# Patient Record
Sex: MALE | Race: OTHER | Employment: FULL TIME | URBAN - METROPOLITAN AREA
[De-identification: names, ages, dates, MRNs, and addresses within clinical notes are randomized per-mention and may not be internally consistent; named-entity substitution may affect disease eponyms.]

---

## 2018-05-12 ENCOUNTER — APPOINTMENT (OUTPATIENT)
Dept: GENERAL RADIOLOGY | Age: 53
End: 2018-05-12
Attending: EMERGENCY MEDICINE
Payer: OTHER GOVERNMENT

## 2018-05-12 ENCOUNTER — HOSPITAL ENCOUNTER (EMERGENCY)
Age: 53
Discharge: HOME | End: 2018-05-12
Attending: EMERGENCY MEDICINE
Payer: OTHER GOVERNMENT

## 2018-05-12 VITALS
RESPIRATION RATE: 18 BRPM | HEART RATE: 72 BPM | TEMPERATURE: 98.7 F | BODY MASS INDEX: 31.1 KG/M2 | WEIGHT: 210 LBS | DIASTOLIC BLOOD PRESSURE: 90 MMHG | OXYGEN SATURATION: 100 % | HEIGHT: 69 IN | SYSTOLIC BLOOD PRESSURE: 141 MMHG

## 2018-05-12 DIAGNOSIS — S76.212A INGUINAL STRAIN, LEFT, INITIAL ENCOUNTER: Primary | ICD-10-CM

## 2018-05-12 PROCEDURE — 74011250636 HC RX REV CODE- 250/636: Performed by: EMERGENCY MEDICINE

## 2018-05-12 PROCEDURE — 74011250637 HC RX REV CODE- 250/637: Performed by: EMERGENCY MEDICINE

## 2018-05-12 PROCEDURE — 73502 X-RAY EXAM HIP UNI 2-3 VIEWS: CPT

## 2018-05-12 PROCEDURE — 99282 EMERGENCY DEPT VISIT SF MDM: CPT

## 2018-05-12 PROCEDURE — 96372 THER/PROPH/DIAG INJ SC/IM: CPT

## 2018-05-12 RX ORDER — ATORVASTATIN CALCIUM 40 MG/1
TABLET, FILM COATED ORAL DAILY
COMMUNITY

## 2018-05-12 RX ORDER — KETOROLAC TROMETHAMINE 30 MG/ML
60 INJECTION, SOLUTION INTRAMUSCULAR; INTRAVENOUS
Status: COMPLETED | OUTPATIENT
Start: 2018-05-12 | End: 2018-05-12

## 2018-05-12 RX ORDER — TRAMADOL HYDROCHLORIDE 50 MG/1
50 TABLET ORAL
Status: DISCONTINUED | OUTPATIENT
Start: 2018-05-12 | End: 2018-05-12

## 2018-05-12 RX ORDER — AMLODIPINE AND VALSARTAN 10; 160 MG/1; MG/1
1 TABLET ORAL DAILY
COMMUNITY

## 2018-05-12 RX ORDER — TRAMADOL HYDROCHLORIDE 50 MG/1
50 TABLET ORAL
Qty: 15 TAB | Refills: 0 | Status: SHIPPED | OUTPATIENT
Start: 2018-05-12

## 2018-05-12 RX ORDER — TRAMADOL HYDROCHLORIDE 50 MG/1
50 TABLET ORAL
Qty: 12 TAB | Refills: 0 | Status: SHIPPED | OUTPATIENT
Start: 2018-05-12 | End: 2018-05-12

## 2018-05-12 RX ORDER — TRAMADOL HYDROCHLORIDE 50 MG/1
50 TABLET ORAL
Status: COMPLETED | OUTPATIENT
Start: 2018-05-12 | End: 2018-05-12

## 2018-05-12 RX ADMIN — KETOROLAC TROMETHAMINE 60 MG: 30 INJECTION, SOLUTION INTRAMUSCULAR at 14:30

## 2018-05-12 RX ADMIN — TRAMADOL HYDROCHLORIDE 50 MG: 50 TABLET, FILM COATED ORAL at 14:30

## 2018-05-12 NOTE — DISCHARGE INSTRUCTIONS
Groin Strain: Care Instructions  Your Care Instructions  A groin strain is an injury that happens when you tear or overstretch (pull) a groin muscle. The groin muscles are in the area on either side of the body in the folds where the belly joins the legs. You can strain a groin muscle during exercise, such as running, skating, kicking in soccer, or playing basketball. It can happen when you lift, push, or pull heavy objects. You might pull a groin muscle when you fall. The injury can range from a minor pull to a more serious tear of the muscle. You may feel pain and tenderness that's worse when you squeeze your legs together. You may also have pain when you raise the knee of the injured side. There may be swelling or bruising in the groin area or inner thigh. If you have a bad strain, you may walk with a limp while it heals. Rest and other home care can help the muscle heal. Healing can take up to 3 weeks or more. Your doctor may want to see you again in 2 to 3 weeks. Follow-up care is a key part of your treatment and safety. Be sure to make and go to all appointments, and call your doctor if you are having problems. It's also a good idea to know your test results and keep a list of the medicines you take. How can you care for yourself at home? · Be safe with medicines. Read and follow all instructions on the label. ¨ If the doctor gave you a prescription medicine for pain, take it as prescribed. ¨ If you are not taking a prescription pain medicine, ask your doctor if you can take an over-the-counter medicine. · Rest and protect your injured or sore groin area for 1 to 2 weeks. Stop, change, or take a break from any activity that may be causing your pain or soreness. Do not do intense activities while you still have pain. · Put ice or a cold pack on your groin area for 10 to 20 minutes at a time. Try to do this every 1 to 2 hours for the next 3 days (when you are awake) or until the swelling goes down. Put a thin cloth between the ice and your skin. · After 2 or 3 days, if your swelling is gone, apply heat. Put a warm water bottle, a heating pad set on low, or a warm cloth on your groin area. Do not go to sleep with a heating pad on your skin. · If your doctor gave you crutches, make sure you use them as directed. · Wear snug shorts or underwear that support the injured area. When should you call for help? Call your doctor now or seek immediate medical care if:  ? · You have new or severe pain or swelling in the groin area. ? · Your groin or upper thigh is cool or pale or changes color. ? · You have tingling, weakness, or numbness in your groin or leg. ? · You cannot move your leg. ? · You cannot put weight on your leg. ? Watch closely for changes in your health, and be sure to contact your doctor if:  ? · You do not get better as expected. Where can you learn more? Go to http://frankie-ynes.info/. Enter Y554 in the search box to learn more about \"Groin Strain: Care Instructions. \"  Current as of: March 21, 2017  Content Version: 11.4  © 3346-9583 [employer]. Care instructions adapted under license by [employer] (which disclaims liability or warranty for this information). If you have questions about a medical condition or this instruction, always ask your healthcare professional. [place] [address] any warranty or liability for your use of this information.

## 2018-05-12 NOTE — ED TRIAGE NOTES
Left groin pain x 2 days.  Recently moved into new house and feels as though he may have pulled something

## 2018-05-12 NOTE — ED PROVIDER NOTES
EMERGENCY DEPARTMENT HISTORY AND PHYSICAL EXAM    1:01 PM      Date: 5/12/2018  Patient Name: Mirella Barnard    History of Presenting Illness     Chief Complaint   Patient presents with    Groin Pain     left       History Provided By: Patient    Chief Complaint: Groin pain  Duration: 2 Days  Timing:  Constant  Location: Left sided  Quality:   Severity: Moderate  Modifying Factors: moving boxes/furniture  Associated Symptoms: Gait problem      Additional History (Context): Mirella Barnard is a 46 y.o. male with hx of HTN, DM and Hyperlipemia who presents with c/o constant moderate left sided groin pain onset 2 days. Pt states he has been moving boxes and furniture and believes he might have injured himself in the process. Pt reports having difficulty ambulating secondary to pain. Denies any known drug allergies. Family member at bedside. PCP: None    Current Facility-Administered Medications   Medication Dose Route Frequency Provider Last Rate Last Dose    ketorolac tromethamine (TORADOL) 60 mg/2 mL injection 60 mg  60 mg IntraMUSCular NOW Sandrita Man MD        traMADol Earleen Coppersmith) tablet 50 mg  50 mg Oral NOW Sandrita Man MD        traMADol Earleen Coppersmith) tablet 50 mg  50 mg Oral Q6H PRN Sandrita Man MD         Current Outpatient Prescriptions   Medication Sig Dispense Refill    traMADol (ULTRAM) 50 mg tablet Take 1 Tab by mouth every six (6) hours as needed for Pain. Max Daily Amount: 200 mg. 12 Tab 0    atorvastatin (LIPITOR) 40 mg tablet Take  by mouth daily.  SITagliptin-metFORMIN (JANUMET) 50-1,000 mg per tablet Take 1 Tab by mouth two (2) times daily (with meals).  amLODIPine-valsartan (EXFORGE)  mg per tablet Take 1 Tab by mouth daily.          Past History     Past Medical History:  Past Medical History:   Diagnosis Date    Diabetes ([address]     HTN (hypertension)     Hyperlipemia        Past Surgical History:  Past Surgical History:   Procedure Laterality Date    HX PARATHYROIDECTOMY         Family History:  History reviewed. No pertinent family history. Social History:  Social History   Substance Use Topics    Smoking status: Never Smoker    Smokeless tobacco: Never Used    Alcohol use No       Allergies:  No Known Allergies      Review of Systems       Review of Systems   Constitutional: Negative. HENT: Positive for congestion. Eyes: Negative. Respiratory: Negative. Cardiovascular: Negative. Gastrointestinal: Negative. Endocrine: Negative. Genitourinary: Negative. Left sided groin pain   Musculoskeletal: Positive for gait problem. Skin: Negative. Allergic/Immunologic: Negative. Hematological: Negative. Psychiatric/Behavioral: Negative. All other systems reviewed and are negative. Physical Exam     Visit Vitals    /90 (BP 1 Location: Left arm, BP Patient Position: At rest)    Pulse 72    Temp 98.7 °F (37.1 °C)    Resp 18    Ht 5' 9\" (1.753 m)    Wt 95.3 kg (210 lb)    SpO2 100%    BMI 31.01 kg/m2       Physical Exam   Constitutional: He is oriented to person, place, and time. He appears well-developed and well-nourished. No distress. HENT:   Head: Normocephalic. Mouth/Throat: Oropharynx is clear and moist.   Eyes: Conjunctivae and EOM are normal. Pupils are equal, round, and reactive to light. Neck: Normal range of motion. Neck supple. Cardiovascular: Normal rate, regular rhythm, normal heart sounds and intact distal pulses. No murmur heard. Pulmonary/Chest: Effort normal and breath sounds normal. No respiratory distress. He has no wheezes. He has no rales. He exhibits no tenderness. Abdominal: Soft. Bowel sounds are normal. He exhibits no distension. There is no tenderness. There is no rebound. Hernia confirmed negative in the left inguinal area. Genitourinary: Left testis shows no mass. Genitourinary Comments: Painful with ROM of left hip in left inguinal area.   No palpable hernia or mass in area.   Musculoskeletal: Normal range of motion. He exhibits no edema or tenderness. Neurological: He is alert and oriented to person, place, and time. No cranial nerve deficit. He exhibits normal muscle tone. Coordination normal.   Skin: Skin is warm and dry. No rash noted. Psychiatric: He has a normal mood and affect. His behavior is normal. Judgment and thought content normal.   Nursing note and vitals reviewed. Diagnostic Study Results     Radiologic Studies -   XR HIP LT W OR WO PELV 2-3 VWS    (Results Pending)   XR Hip Left: ED Provider Interpretation - No acute fracture. 2:16 PM      Medical Decision Making   I am the first provider for this patient. I reviewed the vital signs, available nursing notes, past medical history, past surgical history, family history and social history. Vital Signs-Reviewed the patient's vital signs. Pulse Oximetry Analysis -  100% on room air (Interpretation) Normal    Cardiac Monitor:  Rate: 72 bpm  Rhythm:  Normal Sinus Rhythm       Records Reviewed: Nursing Notes (Time of Review: 1:03 PM)      Provider Notes (Medical Decision Making):  DDX: Fracture, Contusion, Dislocation, Sprain      Diagnosis     Clinical Impression:   1. Inguinal strain, left, initial encounter        Disposition: Discharge    Follow-up Information     Follow up With Details Comments [address] Call in 2 days For follow up [address] [facility]    [id number] [place] [address] EMERGENCY DEPT Go to As needed, If symptoms worsen [address] [facility]  [phone]           Patient's Medications   Start Taking    TRAMADOL (ULTRAM) 50 MG TABLET    Take 1 Tab by mouth every six (6) hours as needed for Pain. Max Daily Amount: 200 mg. Continue Taking    AMLODIPINE-VALSARTAN (EXFORGE)  MG PER TABLET    Take 1 Tab by mouth daily. ATORVASTATIN (LIPITOR) 40 MG TABLET    Take  by mouth daily. SITAGLIPTIN-METFORMIN (JANUMET) 50-1,000 MG PER TABLET    Take 1 Tab by mouth two (2) times daily (with meals). These Medications have changed    No medications on file   Stop Taking    No medications on file     _______________________________    Attestations:  [address] acting as a scribe for and in the presence of Ana Maria Hardwick MD      May 12, 2018 at 1:03 PM       Provider Attestation:      I personally performed the services described in the documentation, reviewed the documentation, as recorded by the scribe in my presence, and it accurately and completely records my words and actions.  May 12, 2018 at 1:03 PM - Ana Maria Hardwick MD    _______________________________

## 2018-05-12 NOTE — LETTER
[address] [place] [address] [facility] EMERGENCY DEPT 
[address] [facility] [id number] [phone] Work/School Note Date: 5/12/2018 To Whom It May concern: 
 
Maya Gaming was seen and treated today in the emergency room by the following provider(s): 
Attending Provider: Juan Antonio Gee MD.   
 
Maya Gaming may return to work on 5/15/2018.  
 
Sincerely, 
 
 
 
 
Acacia Anderson RN